# Patient Record
Sex: MALE | Race: WHITE | URBAN - METROPOLITAN AREA
[De-identification: names, ages, dates, MRNs, and addresses within clinical notes are randomized per-mention and may not be internally consistent; named-entity substitution may affect disease eponyms.]

---

## 2017-12-19 NOTE — PROGRESS NOTES
SUBJECTIVE:                                                    Kip Escalera is a 56 year old male who presents to clinic today for the following health issues:    HPI    Back Pain       Duration: 6 months , get worse at night and can not sleep        Specific cause: none    Description:   Location of pain: upper back right   Character of pain: sharp and constant  Pain radiation:up to right neck and head   New numbness or weakness in legs, not attributed to pain:  no     Intensity: At its worst 6/10    History:   Pain interferes with job: No  History of back problems: no prior back problems  Any previous MRI or X-rays: None  Sees a specialist for back pain:  No  Therapies tried without relief:  cold pack and heat pad( some help not much )      Alleviating factors:   Improved by: ibuprofen( take 8 tablets at night)  and acetaminophen (Tylenol)      Precipitating factors:  Worsened by: sleep at night,laying flat,  inactivity,     Functional and Psychosocial Screen (Cm STarT Back):      Last 2 scores:     CM START BACK TOTAL SCORE 12/22/2017   Total Score (all 9) 2           Accompanying Signs & Symptoms:  Risk of Fracture:  None  Risk of Cauda Equina:  None  Risk of Infection:  None  Risk of Cancer:  History of cancer  Risk of Ankylosing Spondylitis:  Onset at age <35, male, AND morning back stiffness. no     Blood pressure: the patient reports that his blood pressure is pretty high today. He has been on hypertension medications in the past but states that was awhile ago. He denies chest pain or SOB. He reports no adverse side effects to lisinopril use.     Neck pain: the patient claims that he is having intermittent pain to the right trapezius region at rest. He explains that the pain starts in the right trapezius area and radiates superiorly to the occipital area of his head He reports that this often happens when he gets home at night and wakes him up.     Right now he is not having any pain. He says it  feels tight, but his pain only really comes at night.     He has tried heat and ice. He says that he has to take Tylenol and Ibuprofen every night for his pain. He has also tried stretching and exercises to manage the pain but hasn't had much success.     Exercise: the patient reports that he walks for physical activity.     Sleep: the patient sleeps in a recliner at a 45 degree angle, and has been doing this for several months.     Social History: the patient works at a desk job. He also works at [employer].     Medical history: the patient has been diagnosed with DDD in the cervical spine. He reports he had an XR of the cervical spine in 2010.     Problem list and histories reviewed & adjusted, as indicated.  Additional history: as documented    Patient Active Problem List   Diagnosis     CEREBELLAR ATAXIA,UNSPEC     DJD (degenerative joint disease) of cervical spine     Personal history of prostate cancer     CARDIOVASCULAR SCREENING; LDL GOAL LESS THAN 130     Vitamin D deficiency     OJ (obstructive sleep apnea)     ED (erectile dysfunction) of organic origin     Abnormal MRI of head     Brainstem infarction (H)     Cluster headache syndrome     CARDIOVASCULAR SCREENING; LDL GOAL LESS THAN 160     Past Surgical History:   Procedure Laterality Date     ABDOMEN SURGERY  12/1999    Radical prostatectomy     C REMV PROSTATE,RETROPUB,RAD,TOT NODES  Jan 1999    Open prostatectomy     HERNIA REPAIR, UMBILICAL  2004       Social History   Substance Use Topics     Smoking status: Never Smoker     Smokeless tobacco: Never Used     Alcohol use No      Comment: stopped 30 yrs ago     Family History   Problem Relation Age of Onset     CANCER Mother      CANCER Father      CEREBROVASCULAR DISEASE Paternal Grandmother      MATT. Paternal Grandfather          Current Outpatient Prescriptions   Medication Sig Dispense Refill     IBUPROFEN PO Take as needed for pain       ACETAMINOPHEN PO Take as needed for pain        "cyclobenzaprine (FLEXERIL) 10 MG tablet Take 0.5-1 tablets (5-10 mg) by mouth nightly as needed for muscle spasms 20 tablet 1     aspirin 325 MG EC tablet Take 1 tablet (325 mg) by mouth daily (Patient not taking: Reported on 12/22/2017) 60 tablet      tadalafil (CIALIS) 20 MG tablet Take 0.5-1 tablets (10-20 mg) by mouth daily as needed for erectile dysfunction Never use with nitroglycerin, terazosin or doxazosin. (Patient not taking: Reported on 12/22/2017) 12 tablet 11     Allergies   Allergen Reactions     Seasonal Allergies      ROS:  Constitutional, neuro, ENT, endocrine, pulmonary, cardiac, gastrointestinal, genitourinary, musculoskeletal, integument and psychiatric systems are negative, except as otherwise noted.    This document serves as a record of the services and decisions personally performed and made by Millie Lucas DNP. It was created on her behalf by Otis Alexander, a trained medical scribe. The creation of this document is based on the provider's statements to the medical scribe.  Otis Alexander 11:21 AM December 22, 2017    OBJECTIVE:                                                    /82  Pulse 60  Temp 97.7  F (36.5  C) (Oral)  Resp 16  Ht 5' 5.75\" (1.67 m)  Wt 187 lb (84.8 kg)  BMI 30.41 kg/m2  Body mass index is 30.41 kg/(m^2).     GENERAL APPEARANCE: healthy, alert and no distress, obese   EYES: Eyes grossly normal to inspection, PERRL and conjunctivae and sclerae normal  HENT: ear canals and TM's normal and nose and mouth without ulcers or lesions  NECK: no adenopathy, no asymmetry, masses, or scars and thyroid normal to palpation  RESP: lungs clear to auscultation - no rales, rhonchi or wheezes  CV: regular rates and rhythm, normal S1 S2, no S3 or S4 and no murmur, click or rub  NEURO: Normal strength and tone, mentation intact and speech normal  PSYCH: mentation appears normal and affect normal/bright    CERVICAL SPINE/NECK: reduced ROM during right side bending and right " rotation. No paraspinal or vertebral tenderness.     Diagnostic test results:  No results found for this or any previous visit (from the past 24 hour(s)).       ASSESSMENT/PLAN:                                                      ICD-10-CM    1. Cervicalgia M54.2 [facility]RACT[facility] REFERRAL     cyclobenzaprine (FLEXERIL) 10 MG tablet     PAIN MANAGEMENT REFERRAL   2. DDD (degenerative disc disease), cervical M50.30 [facility] HAND, AND CHIROPRACTIC REFERRAL     cyclobenzaprine (FLEXERIL) 10 MG tablet     PAIN MANAGEMENT REFERRAL   3. Advanced directives, counseling/discussion Z71.89     pt. declined info.    4. History of CVA (cerebrovascular accident) Z86.73      Cervicalgia/DDD: Discussed various possible medication and treatment options. Advised a physical therapy program to strengthen the muscles of his neck. He is also encouraged to use Tylenol and not ibuprofen when he needs pain relief. Encouraged weight resistance exercises in his spare time. Physical therapy ordered and pain management referral given. The patient prefers a trigger point injection and is provided the information for a provider location.     Order placed for Flexeril 10 mg tablet. Medication direction, dosage, and side effects discussed with patient.    Lifestyle: recommended a baby aspirin once daily and trying to sleep in a normal flat position again. Advise statin TX with CVA HX and aging, to discuss with PCP at follow-up.     Follow up with Provider - PRN     The information in this document, created by the medical scribe for me, accurately reflects the services I personally performed and the decisions made by me. I have reviewed and approved this document for accuracy prior to leaving the patient care area.  December 22, 2017 11:37 AM    [facility]

## 2017-12-22 ENCOUNTER — OFFICE VISIT (OUTPATIENT)
Dept: FAMILY MEDICINE | Facility: CLINIC | Age: 57
End: 2017-12-22
Payer: COMMERCIAL

## 2017-12-22 VITALS
DIASTOLIC BLOOD PRESSURE: 82 MMHG | RESPIRATION RATE: 16 BRPM | HEART RATE: 60 BPM | HEIGHT: 66 IN | BODY MASS INDEX: 30.05 KG/M2 | SYSTOLIC BLOOD PRESSURE: 132 MMHG | WEIGHT: 187 LBS | TEMPERATURE: 97.7 F

## 2017-12-22 DIAGNOSIS — M50.30 DDD (DEGENERATIVE DISC DISEASE), CERVICAL: ICD-10-CM

## 2017-12-22 DIAGNOSIS — Z86.73 HISTORY OF CVA (CEREBROVASCULAR ACCIDENT): ICD-10-CM

## 2017-12-22 DIAGNOSIS — Z71.89 ADVANCED DIRECTIVES, COUNSELING/DISCUSSION: ICD-10-CM

## 2017-12-22 DIAGNOSIS — M54.2 CERVICALGIA: Primary | ICD-10-CM

## 2017-12-22 PROCEDURE — 99214 OFFICE O/P EST MOD 30 MIN: CPT | Performed by: NURSE PRACTITIONER

## 2017-12-22 RX ORDER — CYCLOBENZAPRINE HCL 10 MG
5-10 TABLET ORAL
Qty: 20 TABLET | Refills: 1 | Status: SHIPPED | OUTPATIENT
Start: 2017-12-22 | End: 2018-01-30

## 2017-12-22 ASSESSMENT — PAIN SCALES - GENERAL: PAINLEVEL: NO PAIN (0)

## 2017-12-22 NOTE — MR AVS SNAPSHOT
After Visit Summary   12/22/2017    Kip Escalera    MRN: [id number]           Patient Information     Date Of Birth          1960        Visit Information        Provider Department      12/22/2017 11:00 AM Millie Lucas APRN [facility]        Today's Diagnoses     Cervicalgia    -  1    DDD (degenerative disc disease), cervical           Follow-ups after your visit        Additional Services     [facility], AND CHIROPRACTIC REFERRAL       **This order will print in the [facility] Scheduling Office**    Physical Therapy, Hand Therapy and Chiropractic Care are available through:    *[facility]  *[facility]  *[facility]    Call one number to schedule at any of the above locations: [phone].    Your provider has referred you to: [facility] - PT and/or Chiropractic Care determined by clinical presentation at [facility] or [facility] Initial Visit    Indication/Reason for Referral: neck pain  Onset of Illness: 6 months  Therapy Orders: Evaluate and Treat  Special Programs: Acupuncture and per therapist  Special Request: per therapist    Cm P[place]on      Additional Comments for the Therapist or Chiropractor:     Please be aware that coverage of these services is subject to the terms and limitations of your health insurance plan.  Call member services at your health plan with any benefit or coverage questions.      Please bring the following to your appointment:    *Your personal calendar for scheduling future appointments  *Comfortable clothing            PAIN MANAGEMENT REFERRAL       Your provider [facility] referred you to: [facility] Nisa Geller [phone]   [email or web address]       Please call clinic directly to schedule appointment.    **ANY DIAGNOSTIC TESTS THAT ARE NOT IN EPIC SHOULD BE SENT TO THE PAIN CENTER**    REGARDING OPIOID MEDICATIONS:  We will always address  appropriateness of opioid pain medications, but we generally will not automatically take on a prescribing role. When we do take on prescribing of opioids for chronic pain, it is in collaboration with the referring physician for an intermediate period of time (months), with an expectation that the primary physician or provider will assume the prescribing role if medications are effective at stable doses with demonstrated compliance.  Therefore, please do not assume that your prescribing responsibilities end on the day of pain clinic consultation.  Is this agreeable to you? Yes, but not recommending long-term narcotics for this issue     Please be aware that coverage of these services is subject to the terms and limitations of your health insurance plan.  Call member services at your health plan with any benefit or coverage questions.      Please bring the following with you to your appointment:    (1) Any X-Rays, CTs or MRIs which have been performed.  Contact the facility where they were done to arrange for  prior to your scheduled appointment.    (2) List of current medications   (3) This referral request   (4) Any documents/labs given to you for this referral                  Your next 10 appointments already scheduled     Jan 30, 2018  8:00 AM CST   Office Visit with Boogie Silver MD   [facility] ([facility]    [address], [address]  [facility] [address] [phone]           Bring a current list of meds and any records pertaining to this visit. For Physicals, please bring immunization records and any forms needing to be filled out. Please arrive 10 minutes early to complete paperwork.              Who to contact     If you have questions or need follow up information about today's clinic visit or your schedule please contact [facility] directly at [phone].  Normal or non-critical lab and imaging results will be communicated to you by  "MyChart, letter or phone within 4 business days after the clinic has received the results. If you do not hear from us within 7 days, please contact the clinic through [employer] or phone. If you have a critical or abnormal lab result, we will notify you by phone as soon as possible.  Submit refill requests through [employer] or call your pharmacy and they will forward the refill request to us. Please allow 3 business days for your refill to be completed.          Additional Information About Your Visit        [employer] Information     [employer] gives you secure access to your electronic health record. If you see a primary care provider, you can also send messages to your care team and make appointments. If you have questions, please call your primary care clinic.  If you do not have a primary care provider, please call [phone] and they will assist you.        Care EveryWhere ID     This is your Care EveryWhere ID. This could be used by other organizations to access your [place] medical records  [id number]        Your Vitals Were     Pulse Temperature Respirations Height BMI (Body Mass Index)       84 97.7  F (36.5  C) (Oral) 16 5' 5.75\" (1.67 m) 30.41 kg/m2        Blood Pressure from Last 3 Encounters:   12/22/17 146/80   08/19/16 132/82   12/23/14 112/64    Weight from Last 3 Encounters:   12/22/17 187 lb (84.8 kg)   08/19/16 176 lb 6.4 oz (80 kg)   12/23/14 167 lb (75.8 kg)              We Performed the Following     [facility] HAND, AND CHIROPRACTIC REFERRAL     PAIN MANAGEMENT REFERRAL          Today's Medication Changes          These changes are accurate as of: 12/22/17 11:43 AM.  If you have any questions, ask your nurse or doctor.               Start taking these medicines.        Dose/Directions    cyclobenzaprine 10 MG tablet   Commonly known as:  FLEXERIL   Used for:  Cervicalgia, DDD (degenerative disc disease), cervical   Started by:  Millie Lucas APRN CNP        Dose:  5-10 mg   Take 0.5-1 tablets (5-10 " mg) by mouth nightly as needed for muscle spasms   Quantity:  20 tablet   Refills:  1            Where to get your medicines      These medications were sent to [facility] [address] [place] [id number]     Phone:  [phone]     cyclobenzaprine 10 MG tablet                Primary Care Provider Office Phone # Fax #    Boogie Silver [phone] [address] [facility] [address]        Equal Access to Services     : Hadii aad ku hadasho Soomaali, waaxda luqadaha, qaybta kaalmada adeegyada, waxay sohailin hayzack vo . So [facility] [phone].    ATENCIÓN: Si habla español, tiene a parisi disposición servicios gratuitos de asistencia lingüística. [facility] [phone].    We comply with applicable federal civil rights laws and [place] laws. We do not discriminate on the basis of race, color, national origin, age, disability, sex, sexual orientation, or gender identity.            Thank you!     Thank you for choosing [facility]  for your care. Our goal is always to provide you with excellent care. Hearing back from our patients is one way we can continue to improve our services. Please take a few minutes to complete the written survey that you may receive in the mail after your visit with us. Thank you!             Your Updated Medication List - Protect others around you: Learn how to safely use, store and throw away your medicines at www.disposemymeds.org.          This list is accurate as of: 12/22/17 11:43 AM.  Always use your most recent med list.                   Brand Name Dispense Instructions for use Diagnosis    ACETAMINOPHEN PO      Take as needed for pain        aspirin 325 MG EC tablet     60 tablet    Take 1 tablet (325 mg) by mouth daily        cyclobenzaprine 10 MG tablet    FLEXERIL    20 tablet    Take 0.5-1 tablets (5-10 mg) by mouth nightly as needed for muscle spasms     Cervicalgia, DDD (degenerative disc disease), cervical       IBUPROFEN PO      Take as needed for pain        tadalafil 20 MG tablet    CIALIS    12 tablet    Take 0.5-1 tablets (10-20 mg) by mouth daily as needed for erectile dysfunction Never use with nitroglycerin, terazosin or doxazosin.    ED (erectile dysfunction) of organic origin

## 2018-01-22 NOTE — PROGRESS NOTES
SUBJECTIVE:   CC: Kip Escalera is an 57 year old male who presents for preventative health visit.     Physical   Annual:     Getting at least 3 servings of Calcium per day::  NO    Bi-annual eye exam::  Yes    Dental care twice a year::  Yes    Sleep apnea or symptoms of sleep apnea::  None    Diet::  Regular (no restrictions)    Taking medications regularly::  Yes    Medication side effects::  Not applicable    Additional concerns today::  No                    Today's PHQ-2 Score:   PHQ-2 ( 1999 [employer]) 1/30/2018   Q1: Little interest or pleasure in doing things 0   Q2: Feeling down, depressed or hopeless 0   PHQ-2 Score 0   Q1: Little interest or pleasure in doing things Not at all   Q2: Feeling down, depressed or hopeless Not at all   PHQ-2 Score 0       Abuse: Current or Past(Physical, Sexual or Emotional)- No  Do you feel safe in your environment - Yes    Social History   Substance Use Topics     Smoking status: Never Smoker     Smokeless tobacco: Never Used     Alcohol use No      Comment: stopped 30 yrs ago     Alcohol Use 1/30/2018   If you drink alcohol, do you typically have greater than 3 drinks per day OR greater than 7 drinks per week?   No       Last PSA:   PSA   Date Value Ref Range Status   01/30/2018 <0.01 0 - 4 ug/L Final     Comment:     Assay Method:  Chemiluminescence using Siemens Vista analyzer       Reviewed orders with patient. Reviewed health maintenance and updated orders accordingly - Yes      Reviewed and updated as needed this visit by clinical staff  Tobacco  Allergies  Meds  Problems  Med Hx  Surg Hx  Fam Hx  Soc Hx          Reviewed and updated as needed this visit by Provider  Meds  Problems            Review of Systems  C: NEGATIVE for fever, chills, change in weight  CONSTITUTIONAL: Mildly overweight but some weight loss noted over time.  I: NEGATIVE for worrisome rashes, moles or lesions  E: NEGATIVE for vision changes or irritation  ENT: NEGATIVE for ear, mouth and  "throat problems  R: NEGATIVE for significant cough or SOB  RESP: Past history of sleep apnea but currently feels he does not need treatment after losing weight.  B: NEGATIVE for masses, tenderness or discharge  CV: NEGATIVE for chest pain, palpitations or peripheral edema  GI: NEGATIVE for nausea, abdominal pain, heartburn, or change in bowel habits   male: negative for dysuria, hematuria, decreased urinary stream, erectile dysfunction, urethral discharge   male: Approximately 19 years post prostatectomy, radical prostatectomy with no recurrence of disease noted.  Still does have stress incontinence and needs to wear a small pad on a daily basis.  MUSCULOSKELETAL: Reports improvement of the right upper back pain related to cervical disc and joint disease and myofascial spasm.  Good relief with Flexeril.  N: NEGATIVE for weakness, dizziness or paresthesias.  Past history of a brainstem infarction on MRI.  Patient feels there is a slight decrease in normal function on his left upper lower extremity and possibly slight residual effect on speech.  Normal functioning noted.  E: NEGATIVE for temperature intolerance, skin/hair changes  H: NEGATIVE for bleeding problems  P: NEGATIVE for changes in mood or affect    OBJECTIVE:   /80  Pulse 72  Temp 96.8  F (36  C) (Temporal)  Resp 16  Ht 5' 6\" (1.676 m)  Wt 186 lb (84.4 kg)  BMI 30.02 kg/m2    Physical Exam  GENERAL: healthy, alert and no distress  EYES: Eyes grossly normal to inspection, PERRL and conjunctivae and sclerae normal  HENT: ear canals and TM's normal, nose and mouth without ulcers or lesions  NECK: no adenopathy, no asymmetry, masses, or scars and thyroid normal to palpation  RESP: lungs clear to auscultation - no rales, rhonchi or wheezes  BREAST: normal without masses, tenderness or nipple discharge and no palpable axillary masses or adenopathy  CV: regular rate and rhythm, normal S1 S2, no S3 or S4, no murmur, click or rub, no peripheral edema " and peripheral pulses strong  ABDOMEN: soft, nontender, no hepatosplenomegaly, no masses and bowel sounds normal   (male): normal male genitalia without lesions or urethral discharge, no hernia  RECTAL: deferred--post prostatectomy   MS: no gross musculoskeletal defects noted, no edema  SKIN: no suspicious lesions or rashes  NEURO: Normal strength and tone, mentation intact and speech normal.  No residual effects from his brainstem infarction noted clinically.  PSYCH: mentation appears normal, affect normal/bright  LYMPH: no cervical, supraclavicular, axillary, or inguinal adenopathy    Results for orders placed or performed in visit on 01/30/18   CBC with platelets   Result Value Ref Range    WBC 7.0 4.0 - 11.0 10e9/L    RBC Count 4.92 4.4 - 5.9 10e12/L    Hemoglobin 14.9 13.3 - 17.7 g/dL    Hematocrit 45.3 40.0 - 53.0 %    MCV 92 78 - 100 fl    MCH 30.3 26.5 - 33.0 pg    MCHC 32.9 31.5 - 36.5 g/dL    RDW 13.3 10.0 - 15.0 %    Platelet Count 249 150 - 450 10e9/L   Lipid panel reflex to direct LDL Fasting   Result Value Ref Range    Cholesterol 150 <200 mg/dL    Triglycerides 56 <150 mg/dL    HDL Cholesterol 75 >39 mg/dL    LDL Cholesterol Calculated 64 <100 mg/dL    Non HDL Cholesterol 75 <130 mg/dL   Basic metabolic panel   Result Value Ref Range    Sodium 142 133 - 144 mmol/L    Potassium 4.3 3.4 - 5.3 mmol/L    Chloride 107 94 - 109 mmol/L    Carbon Dioxide 28 20 - 32 mmol/L    Anion Gap 7 3 - 14 mmol/L    Glucose 83 70 - 99 mg/dL    Urea Nitrogen 16 7 - 30 mg/dL    Creatinine 0.85 0.66 - 1.25 mg/dL    GFR Estimate >90 >60 mL/min/1.7m2    GFR Estimate If Black >90 >60 mL/min/1.7m2    Calcium 8.9 8.5 - 10.1 mg/dL   PSA, tumor marker   Result Value Ref Range    PSA <0.01 0 - 4 ug/L         ASSESSMENT/PLAN:   1. Routine general medical examination at a health care facility  General physical examination completed with results as noted above.  - CBC with platelets    2. Cervicalgia  Reports improvement over time.   "Refill medications.  - cyclobenzaprine (FLEXERIL) 10 MG tablet; Take 0.5-1 tablets (5-10 mg) by mouth nightly as needed for muscle spasms  Dispense: 20 tablet; Refill: 2    3. DDD (degenerative disc disease), cervical  Improving symptoms as noted  - cyclobenzaprine (FLEXERIL) 10 MG tablet; Take 0.5-1 tablets (5-10 mg) by mouth nightly as needed for muscle spasms  Dispense: 20 tablet; Refill: 2    4. Brainstem infarction (H)  On aspirin daily for antiplatelet activity.    5. Personal history of prostate cancer  PSA remains undetectable  - PSA, tumor marker    6. OJ (obstructive sleep apnea)  Patient feels he is not needing ongoing therapy after weight loss.    7. CARDIOVASCULAR SCREENING; LDL GOAL LESS THAN 160  Lipids are excellent.  - Lipid panel reflex to direct LDL Fasting  - Basic metabolic panel    COUNSELING:   Reviewed preventive health counseling, as reflected in patient instructions  Special attention given to:        Regular exercise       Healthy diet/nutrition       Aspirin Prophylaxsis       Colon cancer screening    BP Screening:   Last 3 BP Readings:    BP Readings from Last 3 Encounters:   01/30/18 118/80   12/22/17 132/82   08/19/16 132/82       The following was recommended to the patient:  Re-screen BP within a year and recommended lifestyle modifications     reports that he has never smoked. He has never used smokeless tobacco.    Estimated body mass index is 30.02 kg/(m^2) as calculated from the following:    Height as of this encounter: 5' 6\" (1.676 m).    Weight as of this encounter: 186 lb (84.4 kg).   Weight management plan: Discussed healthy diet and exercise guidelines and patient will follow up in 12 months in clinic to re-evaluate.    Counseling Resources:  ATP IV Guidelines  Pooled Cohorts Equation Calculator  FRAX Risk Assessment  ICSI Preventive Guidelines  Dietary Guidelines for Americans, 2010  USDA's MyPlate  ASA Prophylaxis  Lung CA Screening    Follow-up in 1 year or as " needed.    The patient understood the rational for the diagnosis and treatment plan. All questions were answered to best of my ability and the patient's satisfaction.    Note: Chart documentation done in part with Dragon Voice Recognition software. Although reviewed after completion, some word and grammatical errors may remain.  Please consider this when interpreting information in this chart.      Boogie Silver MD  [facility]  Answers for HPI/ROS submitted by the patient on 1/30/2018   PHQ-2 Score: 0

## 2018-01-22 NOTE — PATIENT INSTRUCTIONS
Preventive Health Recommendations  Male Ages 50   64    Yearly exam:             See your health care provider every year in order to  o   Review health changes.   o   Discuss preventive care.    o   Review your medicines if your doctor has prescribed any.     Have a cholesterol test every 5 years, or more frequently if you are at risk for high cholesterol/heart disease.     Have a diabetes test (fasting glucose) every three years. If you are at risk for diabetes, you should have this test more often.     Have a colonoscopy at age 50, or have a yearly FIT test (stool test). These exams will check for colon cancer.      Talk with your health care provider about whether or not a prostate cancer screening test (PSA) is right for you.    You should be tested each year for STDs (sexually transmitted diseases), if you re at risk.     Shots: Get a flu shot each year. Get a tetanus shot every 10 years.     Nutrition:    Eat at least 5 servings of fruits and vegetables daily.     Eat whole-grain bread, whole-wheat pasta and brown rice instead of white grains and rice.     Talk to your provider about Calcium and Vitamin D.     Lifestyle    Exercise for at least 150 minutes a week (30 minutes a day, 5 days a week). This will help you control your weight and prevent disease.     Limit alcohol to one drink per day.     No smoking.     Wear sunscreen to prevent skin cancer.     See your dentist every six months for an exam and cleaning.     See your eye doctor every 1 to 2 years.    These are new changes to your current plan of care based on today's visit:    Medications stopped    Medications to be started    Change dose of this medication    New treatments        Follow up appointments:    1.  FOLLOW UP WITH YOUR PRIMARY CARE PROVIDER: 1 year(s) for general physical with fasting blood work    2. FOLLOW UP WITH SPECIALIST:     3. FOLLOW UP WITH NURSE VISIT:     4. IMAGING STUDIES TO BE SCHEDULED:     5. PROCEDURES TO BE  SCHEDULED: Colonoscopy due in 5/2020    Boogie Silver MD

## 2018-01-30 ENCOUNTER — OFFICE VISIT (OUTPATIENT)
Dept: FAMILY MEDICINE | Facility: CLINIC | Age: 58
End: 2018-01-30
Payer: COMMERCIAL

## 2018-01-30 VITALS
RESPIRATION RATE: 16 BRPM | BODY MASS INDEX: 29.89 KG/M2 | DIASTOLIC BLOOD PRESSURE: 80 MMHG | HEIGHT: 66 IN | TEMPERATURE: 96.8 F | SYSTOLIC BLOOD PRESSURE: 118 MMHG | WEIGHT: 186 LBS | HEART RATE: 72 BPM

## 2018-01-30 DIAGNOSIS — M54.2 CERVICALGIA: ICD-10-CM

## 2018-01-30 DIAGNOSIS — Z13.6 CARDIOVASCULAR SCREENING; LDL GOAL LESS THAN 160: ICD-10-CM

## 2018-01-30 DIAGNOSIS — M50.30 DDD (DEGENERATIVE DISC DISEASE), CERVICAL: ICD-10-CM

## 2018-01-30 DIAGNOSIS — Z00.00 ROUTINE GENERAL MEDICAL EXAMINATION AT A HEALTH CARE FACILITY: Primary | ICD-10-CM

## 2018-01-30 DIAGNOSIS — G47.33 OSA (OBSTRUCTIVE SLEEP APNEA): ICD-10-CM

## 2018-01-30 DIAGNOSIS — I63.9 BRAINSTEM INFARCTION (H): ICD-10-CM

## 2018-01-30 DIAGNOSIS — Z85.46 PERSONAL HISTORY OF PROSTATE CANCER: ICD-10-CM

## 2018-01-30 LAB
ANION GAP SERPL CALCULATED.3IONS-SCNC: 7 MMOL/L (ref 3–14)
BUN SERPL-MCNC: 16 MG/DL (ref 7–30)
CALCIUM SERPL-MCNC: 8.9 MG/DL (ref 8.5–10.1)
CHLORIDE SERPL-SCNC: 107 MMOL/L (ref 94–109)
CHOLEST SERPL-MCNC: 150 MG/DL
CO2 SERPL-SCNC: 28 MMOL/L (ref 20–32)
CREAT SERPL-MCNC: 0.85 MG/DL (ref 0.66–1.25)
ERYTHROCYTE [DISTWIDTH] IN BLOOD BY AUTOMATED COUNT: 13.3 % (ref 10–15)
GFR SERPL CREATININE-BSD FRML MDRD: >90 ML/MIN/1.7M2
GLUCOSE SERPL-MCNC: 83 MG/DL (ref 70–99)
HCT VFR BLD AUTO: 45.3 % (ref 40–53)
HDLC SERPL-MCNC: 75 MG/DL
HGB BLD-MCNC: 14.9 G/DL (ref 13.3–17.7)
LDLC SERPL CALC-MCNC: 64 MG/DL
MCH RBC QN AUTO: 30.3 PG (ref 26.5–33)
MCHC RBC AUTO-ENTMCNC: 32.9 G/DL (ref 31.5–36.5)
MCV RBC AUTO: 92 FL (ref 78–100)
NONHDLC SERPL-MCNC: 75 MG/DL
PLATELET # BLD AUTO: 249 10E9/L (ref 150–450)
POTASSIUM SERPL-SCNC: 4.3 MMOL/L (ref 3.4–5.3)
PSA SERPL-MCNC: <0.01 UG/L (ref 0–4)
RBC # BLD AUTO: 4.92 10E12/L (ref 4.4–5.9)
SODIUM SERPL-SCNC: 142 MMOL/L (ref 133–144)
TRIGL SERPL-MCNC: 56 MG/DL
WBC # BLD AUTO: 7 10E9/L (ref 4–11)

## 2018-01-30 PROCEDURE — 99396 PREV VISIT EST AGE 40-64: CPT | Performed by: FAMILY MEDICINE

## 2018-01-30 PROCEDURE — 85027 COMPLETE CBC AUTOMATED: CPT | Performed by: FAMILY MEDICINE

## 2018-01-30 PROCEDURE — 80048 BASIC METABOLIC PNL TOTAL CA: CPT | Performed by: FAMILY MEDICINE

## 2018-01-30 PROCEDURE — 84153 ASSAY OF PSA TOTAL: CPT | Performed by: FAMILY MEDICINE

## 2018-01-30 PROCEDURE — 36415 COLL VENOUS BLD VENIPUNCTURE: CPT | Performed by: FAMILY MEDICINE

## 2018-01-30 PROCEDURE — 80061 LIPID PANEL: CPT | Performed by: FAMILY MEDICINE

## 2018-01-30 RX ORDER — CYCLOBENZAPRINE HCL 10 MG
5-10 TABLET ORAL
Qty: 20 TABLET | Refills: 2 | Status: SHIPPED | OUTPATIENT
Start: 2018-01-30

## 2018-01-30 ASSESSMENT — PAIN SCALES - GENERAL: PAINLEVEL: NO PAIN (0)

## 2018-01-30 NOTE — MR AVS SNAPSHOT
After Visit Summary   1/30/2018    Kip Escalera    MRN: [id number]           Patient Information     Date Of Birth          1960        Visit Information        Provider Department      1/30/2018 8:00 AM Boogie Silver MD [facility]        Today's Diagnoses     Routine general medical examination at a health care facility    -  1    Cervicalgia        DDD (degenerative disc disease), cervical        Brainstem infarction (H)        Personal history of prostate cancer        OJ (obstructive sleep apnea)        CARDIOVASCULAR SCREENING; LDL GOAL LESS THAN 160          Care Instructions      Preventive Health Recommendations  Male Ages 50 - 64    Yearly exam:             See your health care provider every year in order to  o   Review health changes.   o   Discuss preventive care.    o   Review your medicines if your doctor has prescribed any.     Have a cholesterol test every 5 years, or more frequently if you are at risk for high cholesterol/heart disease.     Have a diabetes test (fasting glucose) every three years. If you are at risk for diabetes, you should have this test more often.     Have a colonoscopy at age 50, or have a yearly FIT test (stool test). These exams will check for colon cancer.      Talk with your health care provider about whether or not a prostate cancer screening test (PSA) is right for you.    You should be tested each year for STDs (sexually transmitted diseases), if you re at risk.     Shots: Get a flu shot each year. Get a tetanus shot every 10 years.     Nutrition:    Eat at least 5 servings of fruits and vegetables daily.     Eat whole-grain bread, whole-wheat pasta and brown rice instead of white grains and rice.     Talk to your provider about Calcium and Vitamin D.     Lifestyle    Exercise for at least 150 minutes a week (30 minutes a day, 5 days a week). This will help you control your weight and prevent disease.     Limit alcohol to one  drink per day.     No smoking.     Wear sunscreen to prevent skin cancer.     See your dentist every six months for an exam and cleaning.     See your eye doctor every 1 to 2 years.    These are new changes to your current plan of care based on today's visit:    Medications stopped    Medications to be started    Change dose of this medication    New treatments        Follow up appointments:    1.  FOLLOW UP WITH YOUR PRIMARY CARE PROVIDER: 1 year(s) for general physical with fasting blood work    2. FOLLOW UP WITH SPECIALIST:     3. FOLLOW UP WITH NURSE VISIT:     4. IMAGING STUDIES TO BE SCHEDULED:     5. PROCEDURES TO BE SCHEDULED: Colonoscopy due in 5/2020    Boogie Silver MD                  Follow-ups after your visit        Follow-up notes from your care team     Return in about 1 year (around 1/30/2019) for Physical Exam, Lab Work.      Who to contact     If you have questions or need follow up information about today's clinic visit or your schedule please contact [facility] directly at [phone].  Normal or non-critical lab and imaging results will be communicated to you by [employer], letter or phone within 4 business days after the clinic has received the results. If you do not hear from us within 7 days, please contact the clinic through [employer] or phone. If you have a critical or abnormal lab result, we will notify you by phone as soon as possible.  Submit refill requests through [employer] or call your pharmacy and they will forward the refill request to us. Please allow 3 business days for your refill to be completed.          Additional Information About Your Visit        Live[employer] Information     [employer] gives you secure access to your electronic health record. If you see a primary care provider, you can also send messages to your care team and make appointments. If you have questions, please call your primary care clinic.  If you do not have a primary care provider, please call  "[phone] and they will assist you.        Care EveryWhere ID     This is your Care EveryWhere ID. This could be used by other organizations to access your [facility] medical records  [id number]        Your Vitals Were     Pulse Temperature Respirations Height BMI (Body Mass Index)       72 96.8  F (36  C) (Temporal) 16 5' 6\" (1.676 m) 30.02 kg/m2        Blood Pressure from Last 3 Encounters:   01/30/18 118/80   12/22/17 132/82   08/19/16 132/82    Weight from Last 3 Encounters:   01/30/18 186 lb (84.4 kg)   12/22/17 187 lb (84.8 kg)   08/19/16 176 lb 6.4 oz (80 kg)              We Performed the Following     Basic metabolic panel     CBC with platelets     Lipid panel reflex to direct LDL Fasting     PSA, tumor marker          Today's Medication Changes          These changes are accurate as of 1/30/18  8:22 AM.  If you have any questions, ask your nurse or doctor.               Stop taking these medicines if you haven't already. Please contact your care team if you have questions.     tadalafil 20 MG tablet   Commonly known as:  CIALIS   Stopped by:  Boogie Silver MD                Where to get your medicines      These medications were sent to [facility] [address] [place], [place] [id number]     Phone:  [phone]     cyclobenzaprine 10 MG tablet                Primary Care Provider Office Phone # Fax #    Boogie Silver [phone] [address] [facility] [address]        Equal Access to Services     [facility]: Hadii hitesh friend hadasho Soomaali, waaxda luqadaha, qaybta kaalmada keren escalante. So [facility] [place] [phone].    ATENCIÓN: Si habla español, tiene a parisi disposición servicios gratuitos de asistencia lingüística. Llame al [phone].    We comply with applicable federal civil rights laws and [place] laws. We do not discriminate on the basis of race, color, national origin, " age, disability, sex, sexual orientation, or gender identity.            Thank you!     Thank you for choosing [facility]  for your care. Our goal is always to provide you with excellent care. Hearing back from our patients is one way we can continue to improve our services. Please take a few minutes to complete the written survey that you may receive in the mail after your visit with us. Thank you!             Your Updated Medication List - Protect others around you: Learn how to safely use, store and throw away your medicines at www.disposemymeds.org.          This list is accurate as of 1/30/18  8:22 AM.  Always use your most recent med list.                   Brand Name Dispense Instructions for use Diagnosis    ACETAMINOPHEN PO      Take as needed for pain        aspirin 325 MG EC tablet     60 tablet    Take 1 tablet (325 mg) by mouth daily        cyclobenzaprine 10 MG tablet    FLEXERIL    20 tablet    Take 0.5-1 tablets (5-10 mg) by mouth nightly as needed for muscle spasms    Cervicalgia, DDD (degenerative disc disease), cervical       IBUPROFEN PO      Take as needed for pain

## 2020-02-16 ENCOUNTER — HEALTH MAINTENANCE LETTER (OUTPATIENT)
Age: 60
End: 2020-02-16

## 2020-11-29 ENCOUNTER — HEALTH MAINTENANCE LETTER (OUTPATIENT)
Age: 60
End: 2020-11-29

## 2021-04-10 ENCOUNTER — HEALTH MAINTENANCE LETTER (OUTPATIENT)
Age: 61
End: 2021-04-10

## 2021-09-19 ENCOUNTER — HEALTH MAINTENANCE LETTER (OUTPATIENT)
Age: 61
End: 2021-09-19

## 2022-05-01 ENCOUNTER — HEALTH MAINTENANCE LETTER (OUTPATIENT)
Age: 62
End: 2022-05-01

## 2022-11-21 ENCOUNTER — HEALTH MAINTENANCE LETTER (OUTPATIENT)
Age: 62
End: 2022-11-21

## 2023-06-02 ENCOUNTER — HEALTH MAINTENANCE LETTER (OUTPATIENT)
Age: 63
End: 2023-06-02

## 2024-02-04 ENCOUNTER — HEALTH MAINTENANCE LETTER (OUTPATIENT)
Age: 64
End: 2024-02-04